# Patient Record
Sex: MALE
[De-identification: names, ages, dates, MRNs, and addresses within clinical notes are randomized per-mention and may not be internally consistent; named-entity substitution may affect disease eponyms.]

---

## 2018-08-13 ENCOUNTER — HOSPITAL ENCOUNTER (OUTPATIENT)
Dept: HOSPITAL 88 - RAD | Age: 58
End: 2018-08-13
Attending: FAMILY MEDICINE
Payer: COMMERCIAL
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
## 2018-08-13 DIAGNOSIS — S70.12XA: Primary | ICD-10-CM

## 2018-08-13 NOTE — DIAGNOSTIC IMAGING REPORT
PROCEDURE:FEMUR 2 VIEWS MINIMUM LEFT

 

COMPARISON:None.

 

INDICATIONS:POST FALL 2 MONTHS AGO. LEFT HIP PAIN, RADIATES TO LOWER 

BACK

 

FINDINGS:

Limited by body habitus.

There are no fractures, dislocations, lytic or blastic lesions. The 

bones are well-mineralized. The soft-tissues are unremarkable.

 

CONCLUSION:

No acute fracture or dislocation of the left femur.

 

 

Dictated by:  Kt Stephen M.D. on 8/13/2018 at 18:31     

Electronically approved by:  Kt Stephen M.D. on 8/13/2018 at 18:31

## 2021-05-11 ENCOUNTER — HOSPITAL ENCOUNTER (OUTPATIENT)
Dept: HOSPITAL 88 - US | Age: 61
End: 2021-05-11
Attending: FAMILY MEDICINE
Payer: COMMERCIAL

## 2021-05-11 DIAGNOSIS — E04.9: Primary | ICD-10-CM

## 2021-05-11 PROCEDURE — 76536 US EXAM OF HEAD AND NECK: CPT
